# Patient Record
Sex: MALE | Race: WHITE | NOT HISPANIC OR LATINO | Employment: FULL TIME | URBAN - METROPOLITAN AREA
[De-identification: names, ages, dates, MRNs, and addresses within clinical notes are randomized per-mention and may not be internally consistent; named-entity substitution may affect disease eponyms.]

---

## 2023-03-18 ENCOUNTER — HOSPITAL ENCOUNTER (EMERGENCY)
Facility: CLINIC | Age: 38
Discharge: HOME | End: 2023-03-18
Attending: PHYSICIAN ASSISTANT | Admitting: PHYSICIAN ASSISTANT
Payer: COMMERCIAL

## 2023-03-18 VITALS
RESPIRATION RATE: 16 BRPM | BODY MASS INDEX: 21.87 KG/M2 | WEIGHT: 165 LBS | SYSTOLIC BLOOD PRESSURE: 128 MMHG | HEIGHT: 73 IN | HEART RATE: 65 BPM | DIASTOLIC BLOOD PRESSURE: 71 MMHG | TEMPERATURE: 97.8 F | OXYGEN SATURATION: 99 %

## 2023-03-18 DIAGNOSIS — S61.412A LACERATION OF LEFT HAND WITHOUT FOREIGN BODY, INITIAL ENCOUNTER: ICD-10-CM

## 2023-03-18 PROCEDURE — 90471 IMMUNIZATION ADMIN: CPT | Performed by: PHYSICIAN ASSISTANT

## 2023-03-18 PROCEDURE — 250N000011 HC RX IP 250 OP 636: Performed by: PHYSICIAN ASSISTANT

## 2023-03-18 PROCEDURE — 90715 TDAP VACCINE 7 YRS/> IM: CPT | Performed by: PHYSICIAN ASSISTANT

## 2023-03-18 PROCEDURE — 99283 EMERGENCY DEPT VISIT LOW MDM: CPT | Mod: 25

## 2023-03-18 PROCEDURE — 12002 RPR S/N/AX/GEN/TRNK2.6-7.5CM: CPT

## 2023-03-18 RX ADMIN — CLOSTRIDIUM TETANI TOXOID ANTIGEN (FORMALDEHYDE INACTIVATED), CORYNEBACTERIUM DIPHTHERIAE TOXOID ANTIGEN (FORMALDEHYDE INACTIVATED), BORDETELLA PERTUSSIS TOXOID ANTIGEN (GLUTARALDEHYDE INACTIVATED), BORDETELLA PERTUSSIS FILAMENTOUS HEMAGGLUTININ ANTIGEN (FORMALDEHYDE INACTIVATED), BORDETELLA PERTUSSIS PERTACTIN ANTIGEN, AND BORDETELLA PERTUSSIS FIMBRIAE 2/3 ANTIGEN 0.5 ML: 5; 2; 2.5; 5; 3; 5 INJECTION, SUSPENSION INTRAMUSCULAR at 20:13

## 2023-03-19 NOTE — ED PROVIDER NOTES
"  History     Chief Complaint:  Hand Laceration       HPI   Ashok Amanda is a 37 year old male who presents to the ED for evaluation of a hand laceration.  Patient reports approximate 1 hour prior to presentation he was using a  to open a box when he slipped and sustained a laceration to the palm of his left hand.  He does note some tingling to his fourth and fifth digits.  He is right-handed.      Independent Historian:   None - Patient Only    Review of External Notes:   None    ROS:  Review of Systems    Allergies:  Amoxicillin     Medications:    None    Past Medical History:    Reviewed, no pertinent past medical history    Social History:  Here alone.    PCP: No primary care provider on file.     Physical Exam     Patient Vitals for the past 24 hrs:   BP Temp Temp src Pulse Resp SpO2 Height Weight   03/18/23 1918 128/71 97.8  F (36.6  C) Oral 65 16 99 % 1.854 m (6' 1\") 74.8 kg (165 lb)        Physical Exam  HEENT: mmm  Eyes: PERRL B/L  Neck: Supple  CV: Peripheral pulses intact and regular  Resp: Speaking in full sentences without any respiratory distress  Ext:  Left Hand  4cm C shaped laceration to the medial aspect of the palm overlying the mid 5th metacarpal  No bony TTP.  Full ROM of digits  <2 sec cap refill to all digits.  Sensation intact distally to all digits  Remainder of the skeletal survey is unremarkable  Skin: warm dry well perfused. See above.  Neuro: Alert  Nursing notes and vital signs reviewed.      Emergency Department Course     Procedures     Narrative: Procedure: Laceration Repair        LACERATION:  A simple clean 4 cm laceration.      LOCATION:  Left palm      FUNCTION:  Distally sensation, circulation, motor and tendon function are intact.      ANESTHESIA:  Local using 1% lidocaine with epi       PREPARATION:  Irrigation and Scrubbing with Normal Saline and Shur Clens      DEBRIDEMENT:  no debridement and wound explored, no foreign body found      CLOSURE:  Wound was " closed with One Layer.  Skin closed with 8 x 4.0 Ethylon using interrupted sutures.      Emergency Department Course & Assessments:    Interventions:  Medications   Tdap (tetanus-diphtheria-acell pertussis) (ADACEL) injection 0.5 mL (has no administration in time range)        Assessments:  I evaluated the patient    Independent Interpretation (X-rays, CTs, rhythm strip):  None    Consultations/Discussion of Management or Tests:  None        Social Determinants of Health affecting care:   None    Disposition:  The patient was discharged to home.     Impression & Plan      Medical Decision Making:  Ashok Amanda is a 37 year old male who presents for evaluation of a laceration to the left palm.  The wound was carefully evaluated and explored.  The laceration was closed as noted above.  There is no evidence of muscular, tendon, or bony damage with this laceration.  No signs of foreign body.  Possible complications (infection, scarring) were reviewed with the patient.  Follow up with primary care as noted in the discharge section. Tetanus updated. Discussed reasons to return. All questions answered. Patient discharged to home in stable condition.    Diagnosis:    ICD-10-CM    1. Laceration of left hand without foreign body, initial encounter  S61.412A            Discharge Medications:  New Prescriptions    No medications on file        This record was created at least in part using electronic voice recognition software, so please excuse any typographical errors.       Ruslan Mojica PA-C  03/18/23 2011

## 2023-03-19 NOTE — DISCHARGE INSTRUCTIONS
Keep wound clean and covered with topical antibiotic and bandage.  Monitor wound daily.  Sutures need to be removed in 10-14 days at your primary doctor.    Discharge Instructions  Laceration (Cut)    You were seen today for a laceration (cut).  Your provider examined your laceration for any problems such a buried foreign body (like glass, a splinter, or gravel), or injury to blood vessels, tendons, and nerves.  Your provider may have also rinsed and/or scrubbed your laceration to help prevent an infection. It may not be possible to find all problems with your laceration on the first visit; occasionally foreign bodies or a tendon injury can go undetected.    Your laceration may have been closed in one of several ways:  No closure: many wounds will heal just fine without closure.  Stitches: regular stitches that require removal.  Staples: skin staples are often used in the scalp/head.  Wound adhesive (glue): skin glue can be used for certain lacerations and doesn t require removal.  Wound strips (aka Butterfly bandages or steri-strips): these are bandages that help to close a wound.  Absorbable stitches:  dissolving  stitches that go away on their own and usually don t require removal.    A small percentage of wounds will develop an infection regardless of how well the wound is cared for. Antibiotics are generally not indicated to prevent an infection so are only given for a small number of high-risk wounds. Some lacerations are too high risk to close, and are left open to heal because closure can increase the likelihood that an infection will develop.    Remember that all lacerations, no matter how expertly repaired, will cause scarring. We consider many factors, techniques, and materials, in our efforts to provide the best possible cosmetic outcome.    Generally, every Emergency Department visit should have a follow-up clinic visit with either a primary or a specialty clinic/provider. Please follow-up as instructed  by your emergency provider today.     Return to the Emergency Department right away if:  You have more redness, swelling, pain, drainage (pus), a bad smell, or red streaking from your laceration as these symptoms could indicate an infection.  You have a fever of 100.4 F or more.  You have bleeding that you cannot stop at home. If your cut starts to bleed, hold pressure on the bleeding area with a clean cloth or put pressure over the bandage.  If the bleeding does not stop after using constant pressure for 30 minutes, you should return to the Emergency Department for further treatment.  An area past the laceration is cool, pale, or blue compared with the other side, or has a slower return of color when squeezed.  Your dressing seems too tight or starts to get uncomfortable or painful. For children, signs of a problem might be irritability or restlessness.  You have loss of normal function or use of an area, such as being unable to straighten or bend a finger normally.  You have a numb area past the laceration.    Return to the Emergency Department or see your regular provider if:  The laceration starts to come open.   You have something coming out of the cut or a feeling that there is something in the laceration.  Your wound will not heal, or keeps breaking open. There can always be glass, wood, dirt or other things in any wound.  They will not always show up, even on x-rays.  If a wound does not heal, this may be why, and it is important to follow-up with your regular provider.    Home Care:  Take your dressing off in 12-24 hours, or as instructed by your provider, to check your laceration. Remove the dressing sooner if it seems too tight or painful, or if it is getting numb, tingly, or pale past the dressing.  Gently wash your laceration 1-2 times daily with clean water and mild soap. It is okay to shower or run clean water over the laceration, but do not let the laceration soak in water (no swimming).  If your  laceration was closed with wound adhesive or strips: pat it dry and leave it open to the air. For all other repairs: after you wash your laceration, or at least 2 times a day, apply antibiotic ointment (such as Neosporin  or Bacitracin ) to the laceration, then cover it with a Band-Aid  or gauze.  Keep the laceration clean. Wear gloves or other protective clothing if you are around dirt.    Follow-up for removal:  If your wound was closed with staples or regular stitches, they need to be removed according to the instructions and timeline specified by your provider today.  If your wound was closed with absorbable ( dissolving ) sutures, they should fall out, dissolve, or not be visible in about one week. If they are still visible, then they should be removed according to the instructions and timeline specified by your provider today.    Scars:  To help minimize scarring:  Wear sunscreen over the healed laceration when out in the sun.  Massage the area regularly once healed.  You may apply Vitamin E to the healed wound.  Wait. Scars improve in appearance over months and years.    If you were given a prescription for medicine here today, be sure to read all of the information (including the package insert) that comes with your prescription.  This will include important information about the medicine, its side effects, and any warnings that you need to know about.  The pharmacist who fills the prescription can provide more information and answer questions you may have about the medicine.  If you have questions or concerns that the pharmacist cannot address, please call or return to the Emergency Department.       Remember that you can always come back to the Emergency Department if you are not able to see your regular provider in the amount of time listed above, if you get any new symptoms, or if there is anything that worries you.

## 2023-03-19 NOTE — ED TRIAGE NOTES
Cut his left palm while working on a plant. 5 cm laceration with a  at around 1630.     Triage Assessment     Row Name 03/18/23 1921       Triage Assessment (Adult)    Airway WDL WDL       Respiratory WDL    Respiratory WDL WDL       Skin Circulation/Temperature WDL    Skin Circulation/Temperature WDL X  5cm lac to left palm       Cardiac WDL    Cardiac WDL WDL       Peripheral/Neurovascular WDL    Peripheral Neurovascular WDL WDL       Cognitive/Neuro/Behavioral WDL    Cognitive/Neuro/Behavioral WDL WDL

## 2023-05-21 ENCOUNTER — HEALTH MAINTENANCE LETTER (OUTPATIENT)
Age: 38
End: 2023-05-21

## 2023-06-08 ENCOUNTER — OFFICE VISIT (OUTPATIENT)
Dept: FAMILY MEDICINE | Facility: CLINIC | Age: 38
End: 2023-06-08
Payer: COMMERCIAL

## 2023-06-08 VITALS
HEIGHT: 73 IN | WEIGHT: 169 LBS | SYSTOLIC BLOOD PRESSURE: 124 MMHG | OXYGEN SATURATION: 99 % | RESPIRATION RATE: 16 BRPM | TEMPERATURE: 97.7 F | DIASTOLIC BLOOD PRESSURE: 76 MMHG | BODY MASS INDEX: 22.4 KG/M2 | HEART RATE: 63 BPM

## 2023-06-08 DIAGNOSIS — B35.1 ONYCHOMYCOSIS: ICD-10-CM

## 2023-06-08 DIAGNOSIS — Z11.3 ROUTINE SCREENING FOR STI (SEXUALLY TRANSMITTED INFECTION): ICD-10-CM

## 2023-06-08 DIAGNOSIS — D22.9 BENIGN MOLE: ICD-10-CM

## 2023-06-08 DIAGNOSIS — L30.9 DERMATITIS: ICD-10-CM

## 2023-06-08 DIAGNOSIS — Z11.59 NEED FOR HEPATITIS C SCREENING TEST: ICD-10-CM

## 2023-06-08 DIAGNOSIS — Z13.220 LIPID SCREENING: ICD-10-CM

## 2023-06-08 DIAGNOSIS — Z00.00 VISIT FOR PREVENTIVE HEALTH EXAMINATION: Primary | ICD-10-CM

## 2023-06-08 DIAGNOSIS — Z11.4 SCREENING FOR HIV (HUMAN IMMUNODEFICIENCY VIRUS): ICD-10-CM

## 2023-06-08 PROCEDURE — 87389 HIV-1 AG W/HIV-1&-2 AB AG IA: CPT | Performed by: FAMILY MEDICINE

## 2023-06-08 PROCEDURE — 87591 N.GONORRHOEAE DNA AMP PROB: CPT | Performed by: FAMILY MEDICINE

## 2023-06-08 PROCEDURE — 87491 CHLMYD TRACH DNA AMP PROBE: CPT | Performed by: FAMILY MEDICINE

## 2023-06-08 PROCEDURE — 86780 TREPONEMA PALLIDUM: CPT | Performed by: FAMILY MEDICINE

## 2023-06-08 PROCEDURE — 80061 LIPID PANEL: CPT | Performed by: FAMILY MEDICINE

## 2023-06-08 PROCEDURE — 99385 PREV VISIT NEW AGE 18-39: CPT | Performed by: FAMILY MEDICINE

## 2023-06-08 PROCEDURE — 86803 HEPATITIS C AB TEST: CPT | Performed by: FAMILY MEDICINE

## 2023-06-08 PROCEDURE — 99213 OFFICE O/P EST LOW 20 MIN: CPT | Mod: 25 | Performed by: FAMILY MEDICINE

## 2023-06-08 PROCEDURE — 36415 COLL VENOUS BLD VENIPUNCTURE: CPT | Performed by: FAMILY MEDICINE

## 2023-06-08 RX ORDER — TERBINAFINE HYDROCHLORIDE 250 MG/1
250 TABLET ORAL DAILY
Qty: 84 TABLET | Refills: 0 | Status: SHIPPED | OUTPATIENT
Start: 2023-06-08 | End: 2023-09-06

## 2023-06-08 RX ORDER — TRIAMCINOLONE ACETONIDE 1 MG/G
CREAM TOPICAL 2 TIMES DAILY
Qty: 80 G | Refills: 2 | Status: SHIPPED | OUTPATIENT
Start: 2023-06-08

## 2023-06-08 ASSESSMENT — PAIN SCALES - GENERAL: PAINLEVEL: NO PAIN (0)

## 2023-06-08 NOTE — PROGRESS NOTES
"Assessment/Plan    Preventive.  -  Screening test and immunizations as noted below  - STI screen.  1 partner, female.  No history of STIs  - patient reports that he received usual childhood vaccines    Lower leg rash.  Suspect dermatitis.  Restart triamcinolone as needed.    Dystrophic toenails of left foot, first and fifth.  Exam is suggestive of onychomycosis.  -Reviewed with patient that I suspect the majority of his first toe symptoms are related to past trauma and not onychomycosis  -Discussed the option of seeing podiatry for nail removal +/- phenol  -Patient would prefer trial of antifungal therapy. We will treat with 12 weeks of oral terbinafine.  Patient denies a history of liver disease.  Discouraged heavy alcohol use while taking terbinafine    Left thigh lesion.  Exam suggestive of benign mole.  Observe.    Follow up annually.    Orders Placed This Encounter   Procedures     REVIEW OF HEALTH MAINTENANCE PROTOCOL ORDERS     HIV Antigen Antibody Combo     Hepatitis C Screen Reflex to HCV RNA Quant and Genotype     Lipid panel reflex to direct LDL Fasting     Treponema Abs w Reflex to RPR and Titer     ----  Chief complaint: Establish Care    Social History     Social History Narrative    Updated 6/8/2023: Lived in multiple states as a child.  Lives with girlfriend.  Has dog.  No kids.  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     Rash on left lower leg.  Also sometimes involves thighs.  No upper extremity or scalp involvement.  Very itchy.  Has responded well to triamcinolone in the past.    Dystrophic left first toenail.  \"Shattered\" the nail during a rockclimbing incident about 15 years ago.  Patient feels that the nail has recently become much more misshaped.    Lesion on left inner thigh.    Exam  /76   Pulse 63   Temp 97.7  F (36.5  C) (Temporal)   Resp 16   Ht 1.854 m (6' 1\")   Wt 76.7 kg (169 lb)   SpO2 99%   BMI 22.30 kg/m       Oropharynx without " abnormal masses.  No cervical adenopathy.  Lung fields clear to auscultation bilaterally.  Heart with regular rate and rhythm, no murmurs.    Brown raised lesion on left inner thigh.  About 1 cm in diameter.    Erythematous patch with some excoriated papules on anterior left lower leg.    Dystrophic left first and fifth toenails.

## 2023-06-09 LAB
C TRACH DNA SPEC QL NAA+PROBE: NEGATIVE
CHOLEST SERPL-MCNC: 191 MG/DL
HCV AB SERPL QL IA: NONREACTIVE
HDLC SERPL-MCNC: 54 MG/DL
HIV 1+2 AB+HIV1 P24 AG SERPL QL IA: NONREACTIVE
LDLC SERPL CALC-MCNC: 121 MG/DL
N GONORRHOEA DNA SPEC QL NAA+PROBE: NEGATIVE
NONHDLC SERPL-MCNC: 137 MG/DL
T PALLIDUM AB SER QL: NONREACTIVE
TRIGL SERPL-MCNC: 78 MG/DL

## 2023-06-11 PROBLEM — L30.9 DERMATITIS: Status: ACTIVE | Noted: 2023-06-11

## 2024-05-09 ENCOUNTER — PATIENT OUTREACH (OUTPATIENT)
Dept: CARE COORDINATION | Facility: CLINIC | Age: 39
End: 2024-05-09
Payer: COMMERCIAL

## 2024-05-23 ENCOUNTER — PATIENT OUTREACH (OUTPATIENT)
Dept: CARE COORDINATION | Facility: CLINIC | Age: 39
End: 2024-05-23
Payer: COMMERCIAL

## 2024-06-20 ENCOUNTER — OFFICE VISIT (OUTPATIENT)
Dept: FAMILY MEDICINE | Facility: CLINIC | Age: 39
End: 2024-06-20
Attending: FAMILY MEDICINE
Payer: COMMERCIAL

## 2024-06-20 VITALS
HEART RATE: 61 BPM | OXYGEN SATURATION: 97 % | SYSTOLIC BLOOD PRESSURE: 110 MMHG | DIASTOLIC BLOOD PRESSURE: 74 MMHG | HEIGHT: 72 IN | WEIGHT: 173 LBS | RESPIRATION RATE: 14 BRPM | BODY MASS INDEX: 23.43 KG/M2 | TEMPERATURE: 97.5 F

## 2024-06-20 DIAGNOSIS — Z71.84 TRAVEL ADVICE ENCOUNTER: ICD-10-CM

## 2024-06-20 DIAGNOSIS — R09.81 NASAL CONGESTION: ICD-10-CM

## 2024-06-20 DIAGNOSIS — Z00.00 VISIT FOR PREVENTIVE HEALTH EXAMINATION: Primary | ICD-10-CM

## 2024-06-20 DIAGNOSIS — M76.31 IT BAND SYNDROME, RIGHT: ICD-10-CM

## 2024-06-20 PROCEDURE — 99395 PREV VISIT EST AGE 18-39: CPT | Mod: 25 | Performed by: FAMILY MEDICINE

## 2024-06-20 PROCEDURE — 90632 HEPA VACCINE ADULT IM: CPT | Performed by: FAMILY MEDICINE

## 2024-06-20 PROCEDURE — 90471 IMMUNIZATION ADMIN: CPT | Performed by: FAMILY MEDICINE

## 2024-06-20 PROCEDURE — 99213 OFFICE O/P EST LOW 20 MIN: CPT | Mod: 25 | Performed by: FAMILY MEDICINE

## 2024-06-20 RX ORDER — TRIAMCINOLONE ACETONIDE 55 UG/1
2 SPRAY, METERED NASAL DAILY
Qty: 16.9 ML | Refills: 1 | Status: SHIPPED | OUTPATIENT
Start: 2024-06-20

## 2024-06-20 SDOH — HEALTH STABILITY: PHYSICAL HEALTH: ON AVERAGE, HOW MANY MINUTES DO YOU ENGAGE IN EXERCISE AT THIS LEVEL?: 30 MIN

## 2024-06-20 SDOH — HEALTH STABILITY: PHYSICAL HEALTH: ON AVERAGE, HOW MANY DAYS PER WEEK DO YOU ENGAGE IN MODERATE TO STRENUOUS EXERCISE (LIKE A BRISK WALK)?: 7 DAYS

## 2024-06-20 ASSESSMENT — PAIN SCALES - GENERAL: PAINLEVEL: NO PAIN (0)

## 2024-06-20 ASSESSMENT — SOCIAL DETERMINANTS OF HEALTH (SDOH): HOW OFTEN DO YOU GET TOGETHER WITH FRIENDS OR RELATIVES?: ONCE A WEEK

## 2024-06-20 NOTE — PROGRESS NOTES
Preventive Care Visit  [facility]  Philip Aguiar MD, Family Medicine  Jun 20, 2024  {Provider  Link to SmartSet :438510}    {PROVIDER CHARTING PREFERENCE:348077}    Damion Pulido is a 39 year old, presenting for the following:  Physical        6/20/2024     2:12 PM   Additional Questions   Roomed by Martha WOOD        Health Care Directive  Patient does not have a Health Care Directive or Living Will: {ADVANCE_DIRECTIVE_STATUS:351417}    HPI  ***  {MA/LPN/RN Pre-Provider Visit Orders- hCG/UA/Strep (Optional):427526}  {SUPERLIST (Optional):395500}  {additonal problems for provider to add (Optional):228671}      6/20/2024   General Health   How would you rate your overall physical health? Good   Feel stress (tense, anxious, or unable to sleep) Rather much      (!) STRESS CONCERN      6/20/2024   Nutrition   Three or more servings of calcium each day? Yes   Diet: Regular (no restrictions)   How many servings of fruit and vegetables per day? 4 or more   How many sweetened beverages each day? 0-1            6/20/2024   Exercise   Days per week of moderate/strenous exercise 7 days   Average minutes spent exercising at this level 30 min            6/20/2024   Social Factors   Frequency of gathering with friends or relatives Once a week   Worry food won't last until get money to buy more No   Food not last or not have enough money for food? No   Do you have housing? (Housing is defined as stable permanent housing and does not include staying ouside in a car, in a tent, in an abandoned building, in an overnight shelter, or couch-surfing.) Yes   Are you worried about losing your housing? No   Lack of transportation? No   Unable to get utilities (heat,electricity)? No            6/20/2024   Dental   Dentist two times every year? Yes            6/20/2024   TB Screening   Were you born outside of the [place]? No            Today's PHQ-2 Score:       6/20/2024     1:32 PM   PHQ-2 ( 1999  "[employer])   Q1: Little interest or pleasure in doing things 1   Q2: Feeling down, depressed or hopeless 0   PHQ-2 Score 1   Q1: Little interest or pleasure in doing things Several days   Q2: Feeling down, depressed or hopeless Not at all   PHQ-2 Score 1           6/20/2024   Substance Use   Alcohol more than 3/day or more than 7/wk No   Do you use any other substances recreationally? No        Social History     Tobacco Use    Smoking status: Never    Smokeless tobacco: Never   Vaping Use    Vaping status: Never Used     {Provider  If there are gaps in the social history shown above, please follow the link to update and then refresh the note Link to Social and Substance History :987409}      6/20/2024   STI Screening   New sexual partner(s) since last STI/HIV test? No            6/20/2024   Contraception/Family Planning   Questions about contraception or family planning No        {Provider  Use the storyboard to review patient history, after sections have been marked as reviewed, refresh note to capture documentation:062402}   Reviewed and updated as needed this visit by Provider                    {HISTORY OPTIONS (Optional):457891}    {ROS Picklists (Optional):351427}     Objective    Exam  /74 (BP Location: Left arm, Patient Position: Sitting, Cuff Size: Adult Regular)   Pulse 61   Temp 97.5  F (36.4  C) (Temporal)   Resp 14   Ht 1.84 m (6' 0.44\")   Wt 78.5 kg (173 lb)   SpO2 97%   BMI 23.18 kg/m     Estimated body mass index is 23.18 kg/m  as calculated from the following:    Height as of this encounter: 1.84 m (6' 0.44\").    Weight as of this encounter: 78.5 kg (173 lb).    Physical Exam  {Exam Choices (Optional):327794}        Signed Electronically by: Philip Aguiar MD  {Email feedback regarding this note to [email or web address]   :657347}  "

## 2024-06-20 NOTE — PROGRESS NOTES
Assessment/Plan.    Preventative.  Declines COVID booster.    Right knee pain.  Suspect hamstring tendinopathy and IT band syndrome.  Refer to physical therapy.    Nasal congestion.  Suspect secondary to allergies.  Continue cetirizine.  Given past intolerance of Flonase, will try Nasacort.  Reviewed with patient that it may take a week to appreciate benefit.  Patient can consider discontinuing nasal spray after 1 month or he can use it throughout the year if he chooses.    Travel advice.  Upcoming trip to [place].  Start hepatitis A series.  Patient declines typhoid vaccine.    Next visit in 1-2 years for preventive.    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    Some recent stress.  Water damage at home.  Replaced wall.  Work is overwhelming.  Searching for new job.    Right knee discomfort.  Onset around February/March.  Popliteal area and lateral knee.  Has been slightly more this year.  No recent trauma to the area.  Feels similar to previous hamstring strain.  Did PT in the past.  Patient has right-handed.    Nasal congestion.  History of seasonal allergies, but this feels different.  No cough.  No itchy eyes.  Symptoms started in March/April.  Had some sort of URI in March.  Also had COVID in December.  No new pets at home.  No known mold at home, though did have some water damage as noted above.  Has HEPA filter at home.  Using cetirizine.  Tried Flonase in the past, but disliked taste/smell.    Denies STI concerns.  Partner uses oral contraceptive pill.    Exam  /74 (BP Location: Left arm, Patient Position: Sitting,  "Cuff Size: Adult Regular)   Pulse 61   Temp 97.5  F (36.4  C) (Temporal)   Resp 14   Ht 1.84 m (6' 0.44\")   Wt 78.5 kg (173 lb)   SpO2 97%   BMI 23.18 kg/m      oropharynx without abnormal masses  Left nasal mucosa edematous  No cervical adenopathy  No maxillary or frontal sinus tenderness    lung fields CTAB  heart with regular rate and rhythm, no murmurs  no lower leg edema    Right knee.  No effusion.  No bony tenderness.  No palpable crepitus with lower leg extension/flexion.  No pain with varus or valgus stress.  Lachman negative.  Barrie negative.  "

## 2024-06-22 PROBLEM — J30.2 SEASONAL ALLERGIC RHINITIS: Status: ACTIVE | Noted: 2024-06-22

## 2024-06-22 PROBLEM — J30.2 SEASONAL ALLERGIES: Status: ACTIVE | Noted: 2024-06-22

## 2024-06-22 PROBLEM — M76.31 IT BAND SYNDROME, RIGHT: Status: ACTIVE | Noted: 2024-06-22

## 2024-06-22 PROBLEM — R09.81 NASAL CONGESTION: Status: ACTIVE | Noted: 2024-06-22

## 2025-05-21 ENCOUNTER — PATIENT OUTREACH (OUTPATIENT)
Dept: CARE COORDINATION | Facility: CLINIC | Age: 40
End: 2025-05-21
Payer: COMMERCIAL

## 2025-07-20 ENCOUNTER — HEALTH MAINTENANCE LETTER (OUTPATIENT)
Age: 40
End: 2025-07-20